# Patient Record
Sex: MALE | Race: WHITE | Employment: FULL TIME | URBAN - METROPOLITAN AREA
[De-identification: names, ages, dates, MRNs, and addresses within clinical notes are randomized per-mention and may not be internally consistent; named-entity substitution may affect disease eponyms.]

---

## 2017-02-27 ENCOUNTER — OFFICE VISIT (OUTPATIENT)
Dept: URGENT CARE | Facility: URGENT CARE | Age: 47
End: 2017-02-27
Payer: COMMERCIAL

## 2017-02-27 ENCOUNTER — RADIANT APPOINTMENT (OUTPATIENT)
Dept: GENERAL RADIOLOGY | Facility: CLINIC | Age: 47
End: 2017-02-27
Attending: INTERNAL MEDICINE
Payer: COMMERCIAL

## 2017-02-27 VITALS
TEMPERATURE: 98.1 F | RESPIRATION RATE: 16 BRPM | BODY MASS INDEX: 26.24 KG/M2 | HEART RATE: 76 BPM | OXYGEN SATURATION: 97 % | DIASTOLIC BLOOD PRESSURE: 82 MMHG | SYSTOLIC BLOOD PRESSURE: 132 MMHG | WEIGHT: 179 LBS

## 2017-02-27 DIAGNOSIS — S99.922A TOE TRAUMA, LEFT, INITIAL ENCOUNTER: ICD-10-CM

## 2017-02-27 DIAGNOSIS — S99.922A TOE TRAUMA, LEFT, INITIAL ENCOUNTER: Primary | ICD-10-CM

## 2017-02-27 PROCEDURE — 73660 X-RAY EXAM OF TOE(S): CPT | Mod: LT

## 2017-02-27 PROCEDURE — 99213 OFFICE O/P EST LOW 20 MIN: CPT | Performed by: INTERNAL MEDICINE

## 2017-02-27 ASSESSMENT — ENCOUNTER SYMPTOMS
SENSORY CHANGE: 0
FOCAL WEAKNESS: 0

## 2017-02-27 NOTE — MR AVS SNAPSHOT
"              After Visit Summary   2/27/2017    Chance Bennett    MRN: [id number]           Patient Information     Date Of Birth          1970        Visit Information        Provider Department      2/27/2017 7:15 PM Flako Payton MD [facility]        Today's Diagnoses     Toe trauma, left, initial encounter    -  1      Care Instructions    Avoid any activity that would strain your 5th toe.    Consider following up with a Podiatrist.    See doctor if your toe pain persist/worsens, or if you develop new symptoms.          Follow-ups after your visit        Who to contact     If you have questions or need follow up information about today's clinic visit or your schedule please contact [facility] directly at [phone].  Normal or non-critical lab and imaging results will be communicated to you by MyChart, letter or phone within 4 business days after the clinic has received the results. If you do not hear from us within 7 days, please contact the clinic through MyChart or phone. If you have a critical or abnormal lab result, we will notify you by phone as soon as possible.  Submit refill requests through [employer] or call your pharmacy and they will forward the refill request to us. Please allow 3 business days for your refill to be completed.          Additional Information About Your Visit        [facility] Information     [employer] lets you send messages to your doctor, view your test results, renew your prescriptions, schedule appointments and more. To sign up, go to [email or web address] . Click on \"Log in\" on the left side of the screen, which will take you to the Welcome page. Then click on \"Sign up Now\" on the right side of the page.     You will be asked to enter the access code listed below, as well as some personal information. Please follow the directions to create your username and password.     Your access code is: [email or web address]  Expires: 5/28/2017  " 8:15 PM     Your access code will  in 90 days. If you need help or a new code, please call your [place] clinic or [phone].        Care EveryWhere ID     This is your Care EveryWhere ID. This could be used by other organizations to access your [place] medical records  [id number]        Your Vitals Were     Pulse Temperature Respirations Pulse Oximetry BMI (Body Mass Index)       76 98.1  F (36.7  C) 16 97% 26.24 kg/m2        Blood Pressure from Last 3 Encounters:   17 132/82   16 122/90   16 138/80    Weight from Last 3 Encounters:   17 179 lb (81.2 kg)   16 174 lb 12.8 oz (79.3 kg)   16 172 lb 11.2 oz (78.3 kg)               Primary Care Provider    None Specified       No primary provider on file.        Thank you!     Thank you for choosing [facility]  for your care. Our goal is always to provide you with excellent care. Hearing back from our patients is one way we can continue to improve our services. Please take a few minutes to complete the written survey that you may receive in the mail after your visit with us. Thank you!             Your Updated Medication List - Protect others around you: Learn how to safely use, store and throw away your medicines at www.disposemymeds.org.          This list is accurate as of: 17  8:15 PM.  Always use your most recent med list.                   Brand Name Dispense Instructions for use    lisinopril-hydrochlorothiazide 20-25 MG per tablet    PRINZIDE/ZESTORETIC     Take 1 tablet by mouth daily

## 2017-02-28 NOTE — PROGRESS NOTES
HPI Comments:   Accidentally stubbed his 5th toe on the left foot this morning.  Developed moderate pain since then.  No swelling.    Musculoskeletal Problem   This is a new problem. The current episode started today. The problem occurs constantly. The problem has been gradually worsening. The symptoms are aggravated by walking. He has tried nothing for the symptoms.       Past medical history: no history of toe fracture      Review of Systems   Neurological: Negative for sensory change and focal weakness.       /82  Pulse 76  Temp 98.1  F (36.7  C)  Resp 16  Wt 179 lb (81.2 kg)  SpO2 97%  BMI 26.24 kg/m2      Physical Exam   Constitutional: No distress.   Musculoskeletal: He exhibits tenderness (5th toe of left foot). He exhibits no edema.   Skin: No erythema.   Vitals reviewed.        ICD-10-CM    1. Toe trauma, left, initial encounter S99.922A XR Toe Left G/E 2 Views       Patient Instructions   Avoid any activity that would strain your 5th toe.    Consider following up with a Podiatrist.    See doctor if your toe pain persist/worsens, or if you develop new symptoms.

## 2017-02-28 NOTE — NURSING NOTE
"Chief Complaint   Patient presents with     Urgent Care     Toe Injury     left 5th toe injury this morning      Initial /82  Pulse 76  Temp 98.1  F (36.7  C)  Resp 16  Wt 179 lb (81.2 kg)  SpO2 97%  BMI 26.24 kg/m2 Estimated body mass index is 26.24 kg/(m^2) as calculated from the following:    Height as of 5/2/16: 5' 9.25\" (1.759 m).    Weight as of this encounter: 179 lb (81.2 kg)..  BP completed using cuff size: regular  S ARTURO, CMA    "

## 2017-02-28 NOTE — PATIENT INSTRUCTIONS
Avoid any activity that would strain your 5th toe.    Consider following up with a Podiatrist.    See doctor if your toe pain persist/worsens, or if you develop new symptoms.

## 2017-12-12 ENCOUNTER — OFFICE VISIT (OUTPATIENT)
Dept: URGENT CARE | Facility: URGENT CARE | Age: 47
End: 2017-12-12
Payer: COMMERCIAL

## 2017-12-12 ENCOUNTER — RADIANT APPOINTMENT (OUTPATIENT)
Dept: GENERAL RADIOLOGY | Facility: CLINIC | Age: 47
End: 2017-12-12
Attending: FAMILY MEDICINE
Payer: COMMERCIAL

## 2017-12-12 VITALS
HEIGHT: 69 IN | WEIGHT: 175 LBS | SYSTOLIC BLOOD PRESSURE: 120 MMHG | DIASTOLIC BLOOD PRESSURE: 80 MMHG | HEART RATE: 85 BPM | TEMPERATURE: 98 F | BODY MASS INDEX: 25.92 KG/M2 | OXYGEN SATURATION: 99 %

## 2017-12-12 DIAGNOSIS — M79.672 LEFT FOOT PAIN: ICD-10-CM

## 2017-12-12 DIAGNOSIS — M25.572 PAIN IN JOINT, ANKLE AND FOOT, LEFT: ICD-10-CM

## 2017-12-12 DIAGNOSIS — S93.402A SPRAIN OF LEFT ANKLE, UNSPECIFIED LIGAMENT, INITIAL ENCOUNTER: Primary | ICD-10-CM

## 2017-12-12 PROCEDURE — 73610 X-RAY EXAM OF ANKLE: CPT | Mod: LT

## 2017-12-12 PROCEDURE — 99213 OFFICE O/P EST LOW 20 MIN: CPT | Performed by: FAMILY MEDICINE

## 2017-12-12 PROCEDURE — 73630 X-RAY EXAM OF FOOT: CPT | Mod: LT

## 2017-12-12 NOTE — MR AVS SNAPSHOT
"              After Visit Summary   12/12/2017    Chance Bennett    MRN: [id number]           Patient Information     Date Of Birth          1970        Visit Information        Provider Department      12/12/2017 6:35 PM Perla Puckett MD [facility]        Today's Diagnoses     Left foot pain    -  1    Pain in joint, ankle and foot, left          Care Instructions    No fractures seen by me on x-rays tonight.    Ibuprofen and ice.    Ankle Aircast splint while awake to provide extra support/protection.    Use crutches until you are able to weight-bear.    Arnica gel:  Apply small amount to ankle/foot up to 3 times daily to help with pain/swelling.    Follow up with sports medicine if not steadily improving over the next 10 days or so, sooner if worsening.          Follow-ups after your visit        Who to contact     If you have questions or need follow up information about today's clinic visit or your schedule please contact [facility] directly at [phone].  Normal or non-critical lab and imaging results will be communicated to you by Well[employer][email or web address], letter or phone within 4 business days after the clinic has received the results. If you do not hear from us within 7 days, please contact the clinic through [employer] or phone. If you have a critical or abnormal lab result, we will notify you by phone as soon as possible.  Submit refill requests through [employer] or call your pharmacy and they will forward the refill request to us. Please allow 3 business days for your refill to be completed.          Additional Information About Your Visit        Well[email or web address] Information     [employer] lets you send messages to your doctor, view your test results, renew your prescriptions, schedule appointments and more. To sign up, go to [email or web address] . Click on \"Log in\" on the left side of the screen, which will take you to the Welcome page. Then click on \"Sign up Now\" on the right side of the " "page.     You will be asked to enter the access code listed below, as well as some personal information. Please follow the directions to create your username and password.     Your access code is: [email or web address]  Expires: 3/12/2018  7:15 PM     Your access code will  in 90 days. If you need help or a new code, please call your [place] clinic or [phone].        Care EveryWhere ID     This is your Care EveryWhere ID. This could be used by other organizations to access your [place] medical records  [id number]        Your Vitals Were     Pulse Temperature Height Pulse Oximetry BMI (Body Mass Index)       85 98  F (36.7  C) (Oral) 5' 9.25\" (1.759 m) 99% 25.66 kg/m2        Blood Pressure from Last 3 Encounters:   17 120/80   17 132/82   16 122/90    Weight from Last 3 Encounters:   17 175 lb (79.4 kg)   17 179 lb (81.2 kg)   16 174 lb 12.8 oz (79.3 kg)               Primary Care Provider Office Phone # Fax #    Maxwell Gusman [phone]       [facility] [address] [facility] [address]        Equal Access to Services     [facility] AH: Hadii aad ku hadasho Soomaali, waaxda luqadaha, qaybta kaalmada adeegyada, waxay idiin hayaan patrick ford . So [facility] [place] [phone].    ATENCIÓN: Si habla español, tiene a wiggins disposición servicios gratuitos de asistencia lingüística. Llame al [phone].    We comply with applicable federal civil rights laws and [place] laws. We do not discriminate on the basis of race, color, national origin, age, disability, sex, sexual orientation, or gender identity.            Thank you!     Thank you for choosing [facility]  for your care. Our goal is always to provide you with excellent care. Hearing back from our patients is one way we can continue to improve our services. Please take a few minutes to complete the written survey that you may receive in the mail after your visit with us. Thank you!   "           Your Updated Medication List - Protect others around you: Learn how to safely use, store and throw away your medicines at www.disposemymeds.org.          This list is accurate as of: 12/12/17  7:15 PM.  Always use your most recent med list.                   Brand Name Dispense Instructions for use Diagnosis    lisinopril-hydrochlorothiazide 20-25 MG per tablet    PRINZIDE/ZESTORETIC     Take 1 tablet by mouth daily        order for DME     1 Device    Equipment being ordered: soft-top post op show    Toe trauma, left, initial encounter

## 2017-12-13 NOTE — PROGRESS NOTES
"SUBJECTIVE  Chance Bennett is a 47 year old male who presents today with left ankle pain that occurred last night.    The mechanism of injury includes: inversion strain while playing basketball. Pain was sudden onset and still present and moderately severe.  Therapies to improve symptoms include: ice and NSAIDs  History of recurrent ankle injuries: yes  Pain is not changed since onset.  Aggravating factors: weight-bearing and movement, Relieved partially byest.    No past medical history on file.  Current Outpatient Prescriptions   Medication Sig Dispense Refill     lisinopril-hydrochlorothiazide (PRINZIDE,ZESTORETIC) 20-25 MG per tablet Take 1 tablet by mouth daily       order for DME Equipment being ordered: soft-top post op show 1 Device 0     Social History   Substance Use Topics     Smoking status: Never Smoker     Smokeless tobacco: Former User     Alcohol use 0.0 oz/week     0 Standard drinks or equivalent per week       ROS:  No numbness or tingling in the foot.    OBJECTIVE:  /80  Pulse 85  Temp 98  F (36.7  C) (Oral)  Ht 5' 9.25\" (1.759 m)  Wt 175 lb (79.4 kg)  SpO2 99%  BMI 25.66 kg/m2  EXAM: Patient appears alert,no apparent distress.  Using crutches to aid ambulation.  Ankle Exam: left    Inspection: swelling around the lateral malleolus as well as across the dorsum of the foot.    Palpation: tender over 5th metatarsal mid-shaft but not at the base.  Tender over posterior edge of the distal fibula.    Both doralis pedis and posterior tibial pulses intact.  There is mild ecchymosis across the dorsum of the foot.  Neuro:Normal strength and tone, sensory exam grossly normal    X-Ray: no fractures seen by me.  Reading confirmed by radiologist.    ASSESSMENT  Inversion ankle strain    PLAN  Patient Instructions   No fractures seen by me on x-rays tonight.    Ibuprofen and ice.    Ankle Aircast splint while awake to provide extra support/protection.    Use crutches until you are able to " weight-bear.    Arnica gel:  Apply small amount to ankle/foot up to 3 times daily to help with pain/swelling.    Follow up with sports medicine if not steadily improving over the next 10 days or so, sooner if worsening.

## 2017-12-13 NOTE — NURSING NOTE
"No chief complaint on file.      Initial /80  Pulse 85  Temp 98  F (36.7  C) (Oral)  Ht 5' 9.25\" (1.759 m)  Wt 175 lb (79.4 kg)  SpO2 99%  BMI 25.66 kg/m2 Estimated body mass index is 25.66 kg/(m^2) as calculated from the following:    Height as of this encounter: 5' 9.25\" (1.759 m).    Weight as of this encounter: 175 lb (79.4 kg).  Medication Reconciliation: complete   Nicole Mason MA// December 12, 2017 6:40 PM          "

## 2017-12-13 NOTE — PATIENT INSTRUCTIONS
No fractures seen by me on x-rays tonight.    Ibuprofen and ice.    Ankle Aircast splint while awake to provide extra support/protection.    Use crutches until you are able to weight-bear.    Arnica gel:  Apply small amount to ankle/foot up to 3 times daily to help with pain/swelling.    Follow up with sports medicine if not steadily improving over the next 10 days or so, sooner if worsening.

## 2018-05-07 ENCOUNTER — OFFICE VISIT (OUTPATIENT)
Dept: URGENT CARE | Facility: URGENT CARE | Age: 48
End: 2018-05-07
Payer: COMMERCIAL

## 2018-05-07 VITALS
BODY MASS INDEX: 26.68 KG/M2 | WEIGHT: 182 LBS | OXYGEN SATURATION: 97 % | SYSTOLIC BLOOD PRESSURE: 153 MMHG | HEART RATE: 75 BPM | TEMPERATURE: 98.7 F | DIASTOLIC BLOOD PRESSURE: 97 MMHG

## 2018-05-07 DIAGNOSIS — K04.7 DENTAL ABSCESS: Primary | ICD-10-CM

## 2018-05-07 PROCEDURE — 99213 OFFICE O/P EST LOW 20 MIN: CPT | Performed by: PHYSICIAN ASSISTANT

## 2018-05-07 RX ORDER — PENICILLIN V POTASSIUM 500 MG/1
500 TABLET, FILM COATED ORAL 4 TIMES DAILY
Qty: 40 TABLET | Refills: 0 | Status: SHIPPED | OUTPATIENT
Start: 2018-05-07 | End: 2019-09-21

## 2018-05-07 RX ORDER — ACETAMINOPHEN 160 MG
2000 TABLET,DISINTEGRATING ORAL
COMMUNITY
Start: 2012-11-06

## 2018-05-07 NOTE — PATIENT INSTRUCTIONS
"  Dental Abscess   A dental abscess is an infection of the tooth socket. It often starts with a crack or cavity in the tooth. A pocket of pus forms between the tooth and the bone. The infection causes pain and swelling of the gum, cheek, or jaw. The pain is often made worse by drinking hot or cold fluids, or biting on hard foods. Pain may be felt in the facial sinus or in the ear. A severe infection can interfere with swallowing and breathing.  Causes    Cavities    Trauma    Previous dental work  Symptoms    Pain    Swelling around the tooth or face and cheek    Redness    Bad breath    Bad taste in the mouth    Fever  You will be started on an antibiotic. However, final treatment requires drainage of the pus. This can be done by removing the tooth or performing a root canal. A root canal is done by an oral surgeon. It involves drilling an opening in the tooth to drain the pus. After the infection has healed, a crown is placed over the tooth.  Home care  The following guidelines will help you care for your abscess at home:    Don't have hot or cold foods and liquids. Your tooth may be sensitive to temperature changes.    If your tooth is chipped or cracked, or if there is a large open cavity, apply oil of cloves (available over-the-counter in drugstores) directly to the tooth to reduce pain. Some drugstores carry an over-the-counter \"toothache kit.\" This contains oil of cloves and a paste, which can be applied over the exposed tooth to decrease sensitivity.    Apply an ice pack (ice cubes in a plastic bag, wrapped in a towel) over the injured area for 10 to 20 minutes every 1 to 2 hours the first day for pain relief. Continue this 3 to 4 times a day until the pain and swelling goes away.    You can take acetaminophen or ibuprofen for pain, unless you were given a different pain medicine to use. If you have chronic liver or kidney disease, have ever had a stomach ulcer or gastrointestinal bleeding, or are taking " blood-thinning medicines, talk with your healthcare provider before using these medicines.    An antibiotic will be prescribed. Take it as directed until completed, even if you are feeling better sooner.  Follow-up care  Follow up as advised with a dentist or oral surgeon. Even though your pain may improve with the treatment given today, only a dentist or oral surgeon can provide full treatment for this problem.    If a culture was done, you will be notified if the treatment needs to be changed. You can call in as directed for the results.    If X-rays were taken, they will be reviewed by a specialist. You will be notified of the results, especially if they affect treatment.  Call 911  Call 911 if any of these occur:    Trouble breathing or swallowing, or wheezing    Hoarse voice or trouble speaking    Confusion    Extreme drowsiness or trouble awakening    Fainting or loss of consciousness    Rapid heart rate  When to seek medical advice  Call your healthcare provider right away if any of these occur:    Swollen or red face or eyelid    Pain gets worse or spreads to the neck    Fever of 100.4 F (38 C) or higher, or as directed by your healthcare provider    Unusual drowsiness, a headache or stiff neck, or weakness    Pus drains from the gum or tooth    You can't open your mouth wide  Date Last Reviewed: 7/30/2015 2000-2017 [employer]. [address] [place] [address], [place] [address]. All rights reserved. This information is not intended as a substitute for professional medical care. Always follow your healthcare professional's instructions.

## 2018-05-07 NOTE — PROGRESS NOTES
CHIEF COMPLAINT:   Chief Complaint   Patient presents with     Urgent Care     Jaw Pain     Pt states having left jaw and cheek swelling x 3 days ago.        HPI: Chance Bennett is a 48 year old male who presents to clinic today for evaluation of left sided jaw and cheek swelling. He noted that the symptoms started 3 days ago with teeth pain on the left upper side of face. Over the past day the pain has moved from his teeth to the area of his maxillary sinus. In February, he had dental work completed. Has not had problems with the area until now. Today he noted a swelling along the left side of his jaw, area in non tender. He denies fever, chills, n/v/d. No drainage noted from gumline.     No past medical history on file.  No past surgical history on file.  Social History   Substance Use Topics     Smoking status: Never Smoker     Smokeless tobacco: Former User     Alcohol use 0.0 oz/week     0 Standard drinks or equivalent per week     Current Outpatient Prescriptions   Medication     Cholecalciferol (VITAMIN D3) 2000 units CAPS     lisinopril-hydrochlorothiazide (PRINZIDE,ZESTORETIC) 20-25 MG per tablet     penicillin V potassium (VEETID) 500 MG tablet     No current facility-administered medications for this visit.      Allergies   Allergen Reactions     Dust Mite Extract Itching     Watery eyes       10 point ROS of systems including Constitutional, Eyes, Respiratory, Cardiovascular, Gastroenterology, Genitourinary, Integumentary, Muscularskeletal, Psychiatric were all negative except for pertinent positives noted in my HPI.        Exam:  BP (!) 153/97 (BP Location: Right arm, Patient Position: Chair, Cuff Size: Adult Large)  Pulse 75  Temp 98.7  F (37.1  C) (Tympanic)  Wt 182 lb (82.6 kg)  SpO2 97%  BMI 26.68 kg/m2  Constitutional: healthy, alert and no distress  Head: Normocephalic, atraumatic.  Eyes: conjunctiva clear, no drainage  ENT: TMs clear and shiny raine, nasal mucosa pink and moist, throat without  tonsillar hypertrophy or erythema Mouth: NO dental tenderness or abscess noted  Neck: neck is supple, no cervical lymphadenopathy. Swelling noted over left side of jaw, NO TTP.   Cardiovascular: RRR  Respiratory: CTA bilaterally, no rhonchi or rales  Skin: Tenderness to palpation over left side of cheek, mild erythema. No fluctuance.   Neurologic: Speech clear, gait normal. Moves all extremities.       ASSESSMENT/PLAN:  1. Dental abscess  Will start on PCN VK today to treat suspected dental infection.   IBU 600mg and warm compresses for pain  Follow up this week with dentist for evaluation and imaging.     - penicillin V potassium (VEETID) 500 MG tablet; Take 1 tablet (500 mg) by mouth 4 times daily  Dispense: 40 tablet; Refill: 0    Perla Wakefield PA-C

## 2018-05-07 NOTE — MR AVS SNAPSHOT
"              After Visit Summary   5/7/2018    Chance Bennett    MRN: [id number]           Patient Information     Date Of Birth          1970        Visit Information        Provider Department      5/7/2018 4:20 PM Perla Wakefield PA-C [facility]        Today's Diagnoses     Pain, dental    -  1      Care Instructions      Dental Abscess   A dental abscess is an infection of the tooth socket. It often starts with a crack or cavity in the tooth. A pocket of pus forms between the tooth and the bone. The infection causes pain and swelling of the gum, cheek, or jaw. The pain is often made worse by drinking hot or cold fluids, or biting on hard foods. Pain may be felt in the facial sinus or in the ear. A severe infection can interfere with swallowing and breathing.  Causes    Cavities    Trauma    Previous dental work  Symptoms    Pain    Swelling around the tooth or face and cheek    Redness    Bad breath    Bad taste in the mouth    Fever  You will be started on an antibiotic. However, final treatment requires drainage of the pus. This can be done by removing the tooth or performing a root canal. A root canal is done by an oral surgeon. It involves drilling an opening in the tooth to drain the pus. After the infection has healed, a crown is placed over the tooth.  Home care  The following guidelines will help you care for your abscess at home:    Don't have hot or cold foods and liquids. Your tooth may be sensitive to temperature changes.    If your tooth is chipped or cracked, or if there is a large open cavity, apply oil of cloves (available over-the-counter in drugstores) directly to the tooth to reduce pain. Some drugstores carry an over-the-counter \"toothache kit.\" This contains oil of cloves and a paste, which can be applied over the exposed tooth to decrease sensitivity.    Apply an ice pack (ice cubes in a plastic bag, wrapped in a towel) over the injured area for 10 to 20 minutes " every 1 to 2 hours the first day for pain relief. Continue this 3 to 4 times a day until the pain and swelling goes away.    You can take acetaminophen or ibuprofen for pain, unless you were given a different pain medicine to use. If you have chronic liver or kidney disease, have ever had a stomach ulcer or gastrointestinal bleeding, or are taking blood-thinning medicines, talk with your healthcare provider before using these medicines.    An antibiotic will be prescribed. Take it as directed until completed, even if you are feeling better sooner.  Follow-up care  Follow up as advised with a dentist or oral surgeon. Even though your pain may improve with the treatment given today, only a dentist or oral surgeon can provide full treatment for this problem.    If a culture was done, you will be notified if the treatment needs to be changed. You can call in as directed for the results.    If X-rays were taken, they will be reviewed by a specialist. You will be notified of the results, especially if they affect treatment.  Call 911  Call 911 if any of these occur:    Trouble breathing or swallowing, or wheezing    Hoarse voice or trouble speaking    Confusion    Extreme drowsiness or trouble awakening    Fainting or loss of consciousness    Rapid heart rate  When to seek medical advice  Call your healthcare provider right away if any of these occur:    Swollen or red face or eyelid    Pain gets worse or spreads to the neck    Fever of 100.4 F (38 C) or higher, or as directed by your healthcare provider    Unusual drowsiness, a headache or stiff neck, or weakness    Pus drains from the gum or tooth    You can't open your mouth wide  Date Last Reviewed: 7/30/2015 2000-2017 [email or web address]. [address] [place] [address], [place] [address]. All rights reserved. This information is not intended as a substitute for professional medical care. Always follow your healthcare professional's instructions.                 "Follow-ups after your visit        Who to contact     If you have questions or need follow up information about today's clinic visit or your schedule please contact [facility] directly at [phone].  Normal or non-critical lab and imaging results will be communicated to you by MyChart, letter or phone within 4 business days after the clinic has received the results. If you do not hear from us within 7 days, please contact the clinic through MyChart or phone. If you have a critical or abnormal lab result, we will notify you by phone as soon as possible.  Submit refill requests through [email or web address] or call your pharmacy and they will forward the refill request to us. Please allow 3 business days for your refill to be completed.          Additional Information About Your Visit        [email or web address] Information     [email or web address] lets you send messages to your doctor, view your test results, renew your prescriptions, schedule appointments and more. To sign up, go to [email or web address] . Click on \"Log in\" on the left side of the screen, which will take you to the Welcome page. Then click on \"Sign up Now\" on the right side of the page.     You will be asked to enter the access code listed below, as well as some personal information. Please follow the directions to create your username and password.     Your access code is: [email or web address]  Expires: 2018  4:37 PM     Your access code will  in 90 days. If you need help or a new code, please call your [facility] clinic or [phone].        Care EveryWhere ID     This is your Care EveryWhere ID. This could be used by other organizations to access your [facility] medical records  [id number]        Your Vitals Were     Pulse Temperature Pulse Oximetry BMI (Body Mass Index)          75 98.7  F (37.1  C) (Tympanic) 97% 26.68 kg/m2         Blood Pressure from Last 3 Encounters:   18 (!) 153/97   17 120/80   17 132/82    Weight from Last 3 Encounters: "   05/07/18 182 lb (82.6 kg)   12/12/17 175 lb (79.4 kg)   02/27/17 179 lb (81.2 kg)              Today, you had the following     No orders found for display         Today's Medication Changes          These changes are accurate as of 5/7/18  4:37 PM.  If you have any questions, ask your nurse or doctor.               Start taking these medicines.        Dose/Directions    penicillin V potassium 500 MG tablet   Commonly known as:  VEETID   Used for:  Pain, dental   Started by:  Perla Wakefield PA-C        Dose:  500 mg   Take 1 tablet (500 mg) by mouth 4 times daily   Quantity:  40 tablet   Refills:  0            Where to get your medicines      These medications were sent to [id number] - [place], [place] - [address]  AT [facility]  [address] [place] [address]     Phone:  [phone]     penicillin V potassium 500 MG tablet                Primary Care Provider Office Phone # Fax #    Maxwell Gusman [phone]       [facility] [place] [address]   [place] [address]        Equal Access to Services     [facility]: Hadii aad ku hadasho Soomaali, waaxda luqadaha, qaybta kaalmada adeegyada, gregory saldaña haysaurav ford . So [facility] [phone].    ATENCIÓN: Si habla español, tiene a wiggins disposición servicios gratuitos de asistencia lingüística. [facility] [phone].    We comply with applicable federal civil rights laws and [place] laws. We do not discriminate on the basis of race, color, national origin, age, disability, sex, sexual orientation, or gender identity.            Thank you!     Thank you for choosing [facility]  for your care. Our goal is always to provide you with excellent care. Hearing back from our patients is one way we can continue to improve our services. Please take a few minutes to complete the written survey that you may receive in the mail after your visit with us. Thank you!              Your Updated Medication List - Protect others around you: Learn how to safely use, store and throw away your medicines at www.disposemymeds.org.          This list is accurate as of 5/7/18  4:37 PM.  Always use your most recent med list.                   Brand Name Dispense Instructions for use Diagnosis    lisinopril-hydrochlorothiazide 20-25 MG per tablet    PRINZIDE/ZESTORETIC     Take 1 tablet by mouth daily        * order for DME     1 Device    Equipment being ordered: soft-top post op show    Toe trauma, left, initial encounter       * order for DME     1 each    Ankle AirCast    Sprain of left ankle, unspecified ligament, initial encounter       penicillin V potassium 500 MG tablet    VEETID    40 tablet    Take 1 tablet (500 mg) by mouth 4 times daily    Pain, dental       vitamin D3 2000 units Caps      Take 2,000 Units by mouth        * Notice:  This list has 2 medication(s) that are the same as other medications prescribed for you. Read the directions carefully, and ask your doctor or other care provider to review them with you.

## 2019-09-21 ENCOUNTER — OFFICE VISIT (OUTPATIENT)
Dept: URGENT CARE | Facility: URGENT CARE | Age: 49
End: 2019-09-21
Payer: COMMERCIAL

## 2019-09-21 VITALS
HEART RATE: 78 BPM | BODY MASS INDEX: 28 KG/M2 | WEIGHT: 191 LBS | SYSTOLIC BLOOD PRESSURE: 130 MMHG | DIASTOLIC BLOOD PRESSURE: 88 MMHG | RESPIRATION RATE: 12 BRPM | OXYGEN SATURATION: 98 % | TEMPERATURE: 98.8 F

## 2019-09-21 DIAGNOSIS — H65.91 OME (OTITIS MEDIA WITH EFFUSION), RIGHT: Primary | ICD-10-CM

## 2019-09-21 PROCEDURE — 99213 OFFICE O/P EST LOW 20 MIN: CPT | Performed by: INTERNAL MEDICINE

## 2019-09-21 NOTE — PROGRESS NOTES
[facility] urgent Care Progress Note        Cece Palencia MD, MPH  09/21/2019        History:      Chance Bennett is a pleasant 49 year old year old male with a chief complaint of right ear pain w/o drainage since 3 days ago.   No fever or chills.   No dyspnea or wheezing.   No smoking history.   No headache or neck pain/stiffness.  No GI or  symptoms.   No MSK symptoms.         Assessment and Plan:         OME (otitis media with effusion), right    - amoxicillin-clavulanate (AUGMENTIN) 875-125 MG tablet; Take 1 tablet by mouth 2 times daily for 10 days  Dispense: 20 tablet; Refill: 0    Discussed supportive care with the patient  Advised to increase fluid intake and rest.  Tylenol 650 mg po for pain q 6 hours as needed.  F/u w PCP in 4-5 days, earlier if symptoms worsen.                   Physical Exam:      /88 (BP Location: Right arm, Patient Position: Sitting, Cuff Size: Adult Regular)   Pulse 78   Temp 98.8  F (37.1  C) (Oral)   Resp 12   Wt 86.6 kg (191 lb)   SpO2 98%   BMI 28.00 kg/m       Constitutional: Patient is in no distress The patient is pleasant and cooperative.   HEENT: Head:  Head is atraumatic, normocephalic.    Eyes: Pupils are equal, round and reactive to light and accomodation.  Sclera is non-icteric. No conjunctival injection, or exudate noted. Extraocular motion is intact. Visual acuity is intact bilaterally.  Ears:  External acoustic canals are patent and clear. There is erythema , injection and effusion of the ( R ) tympanic membrane. Left TM is normal on exam.  Nose: No Nasal congestion or drainage or mucosal ulceration is noted.  Throat:  Oral mucosa is moist.  No oral lesions are noted. Posterior pharyngeal hyperemia w/o exudate noted.     Neck Supple.  There is no cervical lymphadenopathy.  No nuchal rigidity noted.  There is no meningismus.     Cardiovascular: Heart is regular to rate and rhythm.  No murmur is noted.     Lungs: Clear in the anterior and posterior  pulmonary fields.                                      Data:      All new lab and imaging data was reviewed.   Results for orders placed or performed in visit on 12/12/17   XR Foot Left G/E 3 Views    Narrative    LEFT FOOT THREE OR MORE VIEWS   12/12/2017 7:03 PM     HISTORY: Inversion injury left ankle, now with significant swelling in  ankle and foot and tender over midshaft 5th metatarsal. Rule out  fracture. Left foot pain.    COMPARISON: None.      Impression    IMPRESSION: Normal.    ASHLEY FITZPATRICK MD

## 2019-09-21 NOTE — NURSING NOTE
"Chief Complaint   Patient presents with     Urgent Care     Ear Problem     Right ear pain for a couple days.  He has not had any draiange from that ear.  No fever that he knows of.  He has had ongoing tooth pain and knows he needs a root canal and wonders if it is related.     Initial /88 (BP Location: Right arm, Patient Position: Sitting, Cuff Size: Adult Regular)   Pulse 78   Temp 98.8  F (37.1  C) (Oral)   Resp 12   Wt 86.6 kg (191 lb)   SpO2 98%   BMI 28.00 kg/m   Estimated body mass index is 28 kg/m  as calculated from the following:    Height as of 12/12/17: 1.759 m (5' 9.25\").    Weight as of this encounter: 86.6 kg (191 lb)..  BP completed using cuff size:  regular  Daya Dubon R.N.    "

## 2021-09-21 ENCOUNTER — OFFICE VISIT (OUTPATIENT)
Dept: URGENT CARE | Facility: URGENT CARE | Age: 51
End: 2021-09-21
Payer: COMMERCIAL

## 2021-09-21 VITALS
SYSTOLIC BLOOD PRESSURE: 142 MMHG | DIASTOLIC BLOOD PRESSURE: 84 MMHG | HEART RATE: 90 BPM | OXYGEN SATURATION: 98 % | TEMPERATURE: 98.3 F

## 2021-09-21 DIAGNOSIS — Z20.822 PERSON UNDER INVESTIGATION FOR COVID-19: Primary | ICD-10-CM

## 2021-09-21 DIAGNOSIS — K08.89 PAIN IN TOOTH: ICD-10-CM

## 2021-09-21 LAB
DEPRECATED S PYO AG THROAT QL EIA: NEGATIVE
GROUP A STREP BY PCR: NOT DETECTED

## 2021-09-21 PROCEDURE — 99213 OFFICE O/P EST LOW 20 MIN: CPT | Performed by: PHYSICIAN ASSISTANT

## 2021-09-21 PROCEDURE — 87651 STREP A DNA AMP PROBE: CPT | Performed by: PHYSICIAN ASSISTANT

## 2021-09-21 PROCEDURE — U0005 INFEC AGEN DETEC AMPLI PROBE: HCPCS | Performed by: PHYSICIAN ASSISTANT

## 2021-09-21 PROCEDURE — U0003 INFECTIOUS AGENT DETECTION BY NUCLEIC ACID (DNA OR RNA); SEVERE ACUTE RESPIRATORY SYNDROME CORONAVIRUS 2 (SARS-COV-2) (CORONAVIRUS DISEASE [COVID-19]), AMPLIFIED PROBE TECHNIQUE, MAKING USE OF HIGH THROUGHPUT TECHNOLOGIES AS DESCRIBED BY CMS-2020-01-R: HCPCS | Performed by: PHYSICIAN ASSISTANT

## 2021-09-21 NOTE — PROGRESS NOTES
SUBJECTIVE:     Chance Bennett is a 51 year old male presenting with a chief complaint of tooth pain for 1 week, now with sore throat, sore ears for a few days .  Some runny nose. Sinusitis on and off through the summer.    Will follow up with dentist.        Immunization History   Administered Date(s) Administered     COVID-19,PF,Pfizer 03/31/2021, 04/27/2021     Tdap (Adacel,Boostrix) 10/02/2012         No past medical history on file.  Current Outpatient Medications   Medication Sig Dispense Refill     Cholecalciferol (VITAMIN D3) 2000 units CAPS Take 2,000 Units by mouth       lisinopril-hydrochlorothiazide (PRINZIDE,ZESTORETIC) 20-25 MG per tablet Take 1 tablet by mouth daily       Social History     Tobacco Use     Smoking status: Never Smoker     Smokeless tobacco: Former User   Substance Use Topics     Alcohol use: Yes     Alcohol/week: 0.0 standard drinks       ROS:  10 point ROS negative except as listed above      OBJECTIVE:  BP (!) 142/84   Pulse 90   Temp 98.3  F (36.8  C)   SpO2 98%   GENERAL APPEARANCE: healthy, alert and no distress  EYES: EOMI,  PERRL, conjunctiva clear  HENT: ear canals and TM's normal.  Nose and mouth without ulcers, erythema or lesions. Pain in Tooth 10  NECK: supple, nontender, no lymphadenopathy  RESP: lungs clear to auscultation - no rales, rhonchi or wheezes  CV: regular rates and rhythm, normal S1 S2, no murmur noted  NEURO: Normal strength and tone, sensory exam grossly normal,  normal speech and mentation  SKIN: no suspicious lesions or rashes      Results for orders placed or performed in visit on 09/21/21   Symptomatic COVID-19 Virus (Coronavirus) by PCR Nose     Status: Normal    Specimen: Nose; Swab   Result Value Ref Range    SARS CoV2 PCR Negative Negative    Narrative    Testing was performed using the Aptima SARS-CoV-2 Assay on the  [employer] Instrument System. Additional information about this  Emergency Use Authorization (EUA) assay can be found via the  Lab  Guide. This test should be ordered for the detection of SARS-CoV-2 in  individuals who meet SARS-CoV-2 clinical and/or epidemiological  criteria. Test performance is unknown in asymptomatic patients. This  test is for in vitro diagnostic use under the FDA EUA for  laboratories certified under CLIA to perform high complexity testing.  This test has not been FDA cleared or approved. A negative result  does not rule out the presence of PCR inhibitors in the specimen or  target RNA in concentration below the limit of detection for the  assay. The possibility of a false negative should be considered if  the patient's recent exposure or clinical presentation suggests  COVID-19. This test was validated by the [facility]. This laboratory is certified under  the Clinical Laboratory Improvement Amendments of 1988 (CLIA-88) as  qualified to perform high complexity laboratory testing.   Streptococcus A Rapid Screen w/Reflex to PCR     Status: Normal    Specimen: Throat; Swab   Result Value Ref Range    Group A Strep antigen Negative Negative   Group A Streptococcus PCR Throat Swab     Status: Normal    Specimen: Throat; Swab   Result Value Ref Range    Group A strep by PCR Not Detected Not Detected    Narrative    The Xpert Xpress Strep A test, performed on the [employer], is a rapid, qualitative in vitro diagnostic test for the detection of Streptococcus pyogenes (Group A ß-hemolytic Streptococcus, Strep A) in throat swab specimens from patients with signs and symptoms of pharyngitis. The Xpert Xpress Strep A test can be used as an aid in the diagnosis of Group A Streptococcal pharyngitis. The assay is not intended to monitor treatment for Group A Streptococcus infections. The Xpert Xpress Strep A test utilizes an automated real-time polymerase chain reaction (PCR) to detect Streptococcus pyogenes DNA.       ASSESSMENT:  (Z20.752) Person under  investigation for COVID-19  (primary encounter diagnosis)  (K08.89) Pain in tooth  Plan: Streptococcus A Rapid Screen w/Reflex to PCR,         Group A Streptococcus PCR Throat Swab,         Symptomatic COVID-19 Virus (Coronavirus) by         PCR, amoxicillin-clavulanate (AUGMENTIN)         875-125 MG tablet      Covid-19  Pt was evaluated during a global COVID-19 pandemic, which necessitated consideration that the patient might be at risk for infection with the SARS-CoV-2 virus that causes COVID-19.   Applicable protocols for evaluation were followed during the patient's care.   COVID-19 was considered as part of the patient's evaluation. The plan for testing is:  a test was ordered during this visit.    No severe headache, chest pain, shortness of breath  No additional infectious symptoms  Rest, isolate for 10 days, hydrate, test, follow up if worsening or new symptoms  HH members to isolate until test results, if positive isolate for 2 weeks and follow up for testing if symptoms occur  Red flags and emergent follow up discussed, and understood by patient  Follow up with PCP if symptoms worsen or fail to improve    Surgical mask, gown, shield, hairnet, gloves worn by provider      Patient Instructions     Patient Education     Dental Pain     A crack or cavity in a tooth can cause tooth pain. This is because the crack or cavity exposes the sensitive inner area of the tooth. An infection in the gum or the tooth's root can cause pain and swelling. The pain is often made worse when you have a hot or cold drink. It can also be worse when you bite on hard foods. Pain may spread from the tooth to your ear, or to the part of the jaw on the same side.  Home care  Follow these tips when caring for yourself at home:    Don't have hot and cold foods and drinks. Your tooth may be sensitive to changes in temperature.    Use toothpaste made for sensitive teeth. Brush gently up and down instead of sideways. Brushing sideways can  wear away root surfaces if they are exposed.    If your tooth is chipped or cracked, see a dentist right away. For short-term pain relief, put clove oil right on the tooth. You can buy clove oil at pharmacies. Some pharmacies carry an over-the-counter toothache kit. This has a paste you can put on the exposed tooth to make it less sensitive.    Use a cold pack. Put a cold pack on your jaw over the sore area to help reduce pain.    Ask your healthcare provider about using over-the-counter medicine for pain. You may use this unless your provider prescribed another medicine. If you have long-term (chronic) liver or kidney disease, talk with your provider before using acetaminophen or ibuprofen. Also talk with your provider if you ve had a stomach ulcer or GI (gastrointestinal) bleeding.    Be aware of infection. If you have signs of an infection, you will be given an antibiotic. Take it as directed.  Follow-up care  Follow up with your dentist, or as advised. Your pain may go away with the treatment given today. But only a dentist can fully check and treat the cause of your pain. This will keep the pain from coming back.  Call 911  Call 911 if any of these occur:    Abnormal drowsiness    Headache or stiff neck    Weakness or fainting    Trouble swallowing or breathing  When to get medical advice  Call your healthcare provider right away if any of these occur:    Your face gets swollen or red    Pain gets worse or spreads to your neck    Fever of 100.4 F (38.0 C) or higher, or as directed by your provider    Pus drains from the tooth  [employer] last reviewed this educational content on 12/1/2019 2000-2021 [employer]. All rights reserved. This information is not intended as a substitute for professional medical care. Always follow your healthcare professional's instructions.           Follow up immediately with severe headache, chest pain, or shortness of breath    Rest, isolate for 10 days, hydrate,  follow up if worsening or new symptoms  Household members to isolate until test results, if positive isolate for 2 weeks and follow up for testing if symptoms occur         Patient Education     Coronavirus Disease 2019 (COVID-19): Caring for Yourself or Others   If you or a household member have symptoms of COVID-19, follow the guidelines below. This will help you manage symptoms and keep the virus from spreading.  If you have symptoms of COVID-19    Stay home and contact your care team. They will tell you what to do.    Don t panic. Keep in mind that other illnesses can cause similar symptoms.    Stay away from work, school, and public places.    Limit physical contact with others in your home. Limit visitors. No kissing.  Clean surfaces you touch with disinfectant.  If you need to cough or sneeze, do it into a tissue. Then throw the tissue into the trash. If you don't have tissues, cough or sneeze into the bend of your elbow.  Don t share food or personal items with people in your household. This includes items like eating and drinking utensils, towels, and bedding.  Wear a cloth face mask around other people. During a public health emergency, medical face masks may be reserved for healthcare workers. You may need to make a cloth face mask of your own. You can do this using a bandana, T-shirt, or other cloth. The CDC has instructions on how to make a face mask. Wear the mask so that it covers both your nose and mouth.  If you need to go to a hospital or clinic, call ahead to let them know. Expect the care team to wear masks, gowns, gloves, and eye protection. You may need to come to a different entrance or wait in a separate room.  Follow all instructions from your care team.    If you ve been diagnosed with COVID-19    Stay home and away from others, including other people in your home. (This is called self-isolation.) Don t leave home unless you need to get medical care. Don t go to work, school, or public  places. Don t use buses, taxis, or other public transportation.    Follow all instructions from your care team.    If you need to go to a hospital or clinic, call ahead to let them know. Expect the care team to wear masks, gowns, gloves, and eye protection. You may need to come to a different entrance or wait in a separate room.    Wear a face mask over your nose and mouth. This is to protect others from your germs. If you can t wear a mask, your caregivers should wear one. You may need to make your own mask using a bandana, T-shirt, or other cloth. See the CDC s instructions on how to make a face mask.    Have no contact with pets and other animals.    Don t share food or personal items with people in your household. This includes items like eating and drinking utensils, towels, and bedding.    If you need to cough or sneeze, do it into a tissue. Then throw the tissue into the trash. If you don't have tissues, cough or sneeze into the bend of your elbow.    Wash your hands often.    Self-care at home   At this time, there is no medicine approved to prevent or treat COVID-19. The FDA has approved an antiviral medicine (called remdesivir) for people being treated in the hospital. This is for people 12 years and older who weigh more than about 88 pounds (40 kgs). In certain cases, it may also be used for people younger than 12 years or who weigh less than about 88 pounds (40 kgs)..  Currently, treatment is mostly aimed at helping your body while it fights the virus.    Getting extra rest.    Drink extra fluids 6 to 8 glasses of liquids each day), unless a doctor has told you not to. Ask your care team which fluids are best for you. Avoid fluids that contain caffeine or alcohol.    Taking over-the-counter (OTC) medicine to reduce pain and fever. Your care team will tell you which medicine to use.  If you ve been in the hospital for COVID-19, follow your care team s instructions. They will tell you when to stop  self-isolation. They may also have you change positions to help your breathing (such as lying on your belly).  If a test showed that you have COVID-19, you may be asked to donate plasma after you ve recovered. (This is called COVID-19 convalescent plasma donation.) The plasma may contain antibodies to help fight the virus in others. Experts don't know if the plasma will work well as a treatment. Research continues, and the FDA has approved it for emergency use in certain people with serious or life-threatening COVID-19. For more information, talk to your care team.  Caring for a sick person     Follow all instructions from the care team.    Wash your hands often.    Wear protective clothing as advised.    Make sure the sick person wears a mask. If they can't wear a mask, don t stay in the same room with them. If you must be in the same room, wear a face mask. Make sure the mask covers both the nose and mouth.    Keep track of the sick person s symptoms.    Clean surfaces often with disinfectant. This includes phones, kitchen counters, fridge door handles, bathroom surfaces, and others.    Don t let anyone share household items with the sick person. This includes eating and drinking tools, towels, sheets, or blankets.    Clean fabrics and laundry well.    Keep other people and pets away from the sick person.    When you can stop self-isolation  When you are sick with COVID-19, you should stay away from other people. This is called self-isolation. The rules for ending self-isolation depend on your health in general.  If you are normally healthy:  You can stop self-isolation when all 3 of these are true:    You ve had no fever--and no medicine that reduces fever--for at least 24 hours. And     Your symptoms are better, such as cough or trouble breathing. And     At least 10 days have passed since your symptoms first started.  Talk with your care team before you leave home. They may tell you it s okay to leave, or they  may give you different advice. You do not need to be re-tested.  If you have a weak immune system, or you ve had severe COVID-19:  Follow your care team s instructions. You may be asked to self-isolate for 10 days to 20 days after your symptoms first started. Your care team may want to re-test you for COVID-19.  Note: If you re being treated for cancer, have an immune disorder (such as HIV), or have had a transplant (organ or bone marrow), you may have a weak immune system.  If you've already had COVID-19 once:  If you had the virus over 3 months ago, and you ve been exposed again, treat it like you've never had COVID-19. Stay home, limit your contact with others, call your care team, and watch for symptoms.  If it s been less than 3 months since you had the virus, you re symptom-free, and you ve been exposed again: You don t need to self-isolate or be re-tested. But if you develop new symptoms that can t be linked to another illness, please self-isolate and contact your care team.  When you return to public settings  When you are well enough to go outside your home, follow the CDC s guidance on cloth face masks.    Anyone over age 2 should wear a face mask in public, especially when it's hard to socially distance. This includes public transit, public protests and marches, and crowded stores, bars, and restaurants.    Face masks are most likely to reduce the spread of COVID-19 when they are widely used by people who are out in the public.  Certain people should not wear a face covering. These include:    Children under 2 years old    Anyone with a health, developmental, or mental health condition that can be made worse by wearing a mask    Anyone who is unconscious or unable to remove the face covering without help. See the CDC's guidance on who should not wear a face mask.  When to call your care team  Call your care team right away if a sick person has any of these:    Trouble breathing    Pain or pressure in  chest  If a sick person has any of these, call 911:    Trouble breathing that gets worse    Pain or pressure in chest that gets worse    Blue tint to lips or face    Fast or irregular heartbeat    Confusion or trouble waking    Fainting or loss of consciousness    Coughing up blood  Going home from the hospital   If you have COVID-19 and were recently in the hospital:    Follow the instructions above for self-care and isolation.    Follow the hospital care team s instructions.    Ask questions if anything is unclear to you. Write down answers so you remember them.  Date last modified: 11/23/2020  [employer] last reviewed this educational content on 4/1/2020  This information has been modified by your health care provider with permission from the publisher.    5859-0389 [employer] [facility]. [address] [place] [address], [place] [address]. All rights reserved. This information is not intended as a substitute for professional medical care. Always follow your healthcare professional's instructions.

## 2021-09-21 NOTE — PATIENT INSTRUCTIONS
Patient Education     Dental Pain     A crack or cavity in a tooth can cause tooth pain. This is because the crack or cavity exposes the sensitive inner area of the tooth. An infection in the gum or the tooth's root can cause pain and swelling. The pain is often made worse when you have a hot or cold drink. It can also be worse when you bite on hard foods. Pain may spread from the tooth to your ear, or to the part of the jaw on the same side.  Home care  Follow these tips when caring for yourself at home:    Don't have hot and cold foods and drinks. Your tooth may be sensitive to changes in temperature.    Use toothpaste made for sensitive teeth. Brush gently up and down instead of sideways. Brushing sideways can wear away root surfaces if they are exposed.    If your tooth is chipped or cracked, see a dentist right away. For short-term pain relief, put clove oil right on the tooth. You can buy clove oil at pharmacies. Some pharmacies carry an over-the-counter toothache kit. This has a paste you can put on the exposed tooth to make it less sensitive.    Use a cold pack. Put a cold pack on your jaw over the sore area to help reduce pain.    Ask your healthcare provider about using over-the-counter medicine for pain. You may use this unless your provider prescribed another medicine. If you have long-term (chronic) liver or kidney disease, talk with your provider before using acetaminophen or ibuprofen. Also talk with your provider if you ve had a stomach ulcer or GI (gastrointestinal) bleeding.    Be aware of infection. If you have signs of an infection, you will be given an antibiotic. Take it as directed.  Follow-up care  Follow up with your dentist, or as advised. Your pain may go away with the treatment given today. But only a dentist can fully check and treat the cause of your pain. This will keep the pain from coming back.  Call 911  Call 911 if any of these occur:    Abnormal drowsiness    Headache or stiff  neck    Weakness or fainting    Trouble swallowing or breathing  When to get medical advice  Call your healthcare provider right away if any of these occur:    Your face gets swollen or red    Pain gets worse or spreads to your neck    Fever of 100.4 F (38.0 C) or higher, or as directed by your provider    Pus drains from the tooth  [employer] last reviewed this educational content on 12/1/2019 2000-2021 [employer]. All rights reserved. This information is not intended as a substitute for professional medical care. Always follow your healthcare professional's instructions.           Follow up immediately with severe headache, chest pain, or shortness of breath    Rest, isolate for 10 days, hydrate, follow up if worsening or new symptoms  Household members to isolate until test results, if positive isolate for 2 weeks and follow up for testing if symptoms occur         Patient Education     Coronavirus Disease 2019 (COVID-19): Caring for Yourself or Others   If you or a household member have symptoms of COVID-19, follow the guidelines below. This will help you manage symptoms and keep the virus from spreading.  If you have symptoms of COVID-19    Stay home and contact your care team. They will tell you what to do.    Don t panic. Keep in mind that other illnesses can cause similar symptoms.    Stay away from work, school, and public places.    Limit physical contact with others in your home. Limit visitors. No kissing.  Clean surfaces you touch with disinfectant.  If you need to cough or sneeze, do it into a tissue. Then throw the tissue into the trash. If you don't have tissues, cough or sneeze into the bend of your elbow.  Don t share food or personal items with people in your household. This includes items like eating and drinking utensils, towels, and bedding.  Wear a cloth face mask around other people. During a public health emergency, medical face masks may be reserved for healthcare workers.  You may need to make a cloth face mask of your own. You can do this using a bandana, T-shirt, or other cloth. The [facility] has instructions on how to make a face mask. Wear the mask so that it covers both your nose and mouth.  If you need to go to a hospital or clinic, call ahead to let them know. Expect the care team to wear masks, gowns, gloves, and eye protection. You may need to come to a different entrance or wait in a separate room.  Follow all instructions from your care team.    If you ve been diagnosed with COVID-19    Stay home and away from others, including other people in your home. (This is called self-isolation.) Don t leave home unless you need to get medical care. Don t go to work, school, or public places. Don t use buses, taxis, or other public transportation.    Follow all instructions from your care team.    If you need to go to a hospital or clinic, call ahead to let them know. Expect the care team to wear masks, gowns, gloves, and eye protection. You may need to come to a different entrance or wait in a separate room.    Wear a face mask over your nose and mouth. This is to protect others from your germs. If you can t wear a mask, your caregivers should wear one. You may need to make your own mask using a bandana, T-shirt, or other cloth. See the CDC s instructions on how to make a face mask.    Have no contact with pets and other animals.    Don t share food or personal items with people in your household. This includes items like eating and drinking utensils, towels, and bedding.    If you need to cough or sneeze, do it into a tissue. Then throw the tissue into the trash. If you don't have tissues, cough or sneeze into the bend of your elbow.    Wash your hands often.    Self-care at home   At this time, there is no medicine approved to prevent or treat COVID-19. The FDA has approved an antiviral medicine (called remdesivir) for people being treated in the hospital. This is for people 12 years  and older who weigh more than about 88 pounds (40 kgs). In certain cases, it may also be used for people younger than 12 years or who weigh less than about 88 pounds (40 kgs)..  Currently, treatment is mostly aimed at helping your body while it fights the virus.    Getting extra rest.    Drink extra fluids 6 to 8 glasses of liquids each day), unless a doctor has told you not to. Ask your care team which fluids are best for you. Avoid fluids that contain caffeine or alcohol.    Taking over-the-counter (OTC) medicine to reduce pain and fever. Your care team will tell you which medicine to use.  If you ve been in the hospital for COVID-19, follow your care team s instructions. They will tell you when to stop self-isolation. They may also have you change positions to help your breathing (such as lying on your belly).  If a test showed that you have COVID-19, you may be asked to donate plasma after you ve recovered. (This is called COVID-19 convalescent plasma donation.) The plasma may contain antibodies to help fight the virus in others. Experts don't know if the plasma will work well as a treatment. Research continues, and the FDA has approved it for emergency use in certain people with serious or life-threatening COVID-19. For more information, talk to your care team.  Caring for a sick person     Follow all instructions from the care team.    Wash your hands often.    Wear protective clothing as advised.    Make sure the sick person wears a mask. If they can't wear a mask, don t stay in the same room with them. If you must be in the same room, wear a face mask. Make sure the mask covers both the nose and mouth.    Keep track of the sick person s symptoms.    Clean surfaces often with disinfectant. This includes phones, kitchen counters, fridge door handles, bathroom surfaces, and others.    Don t let anyone share household items with the sick person. This includes eating and drinking tools, towels, sheets, or  blankets.    Clean fabrics and laundry well.    Keep other people and pets away from the sick person.    When you can stop self-isolation  When you are sick with COVID-19, you should stay away from other people. This is called self-isolation. The rules for ending self-isolation depend on your health in general.  If you are normally healthy:  You can stop self-isolation when all 3 of these are true:    You ve had no fever--and no medicine that reduces fever--for at least 24 hours. And     Your symptoms are better, such as cough or trouble breathing. And     At least 10 days have passed since your symptoms first started.  Talk with your care team before you leave home. They may tell you it s okay to leave, or they may give you different advice. You do not need to be re-tested.  If you have a weak immune system, or you ve had severe COVID-19:  Follow your care team s instructions. You may be asked to self-isolate for 10 days to 20 days after your symptoms first started. Your care team may want to re-test you for COVID-19.  Note: If you re being treated for cancer, have an immune disorder (such as HIV), or have had a transplant (organ or bone marrow), you may have a weak immune system.  If you've already had COVID-19 once:  If you had the virus over 3 months ago, and you ve been exposed again, treat it like you've never had COVID-19. Stay home, limit your contact with others, call your care team, and watch for symptoms.  If it s been less than 3 months since you had the virus, you re symptom-free, and you ve been exposed again: You don t need to self-isolate or be re-tested. But if you develop new symptoms that can t be linked to another illness, please self-isolate and contact your care team.  When you return to public settings  When you are well enough to go outside your home, follow the CDC s guidance on cloth face masks.    Anyone over age 2 should wear a face mask in public, especially when it's hard to socially  distance. This includes public transit, public protests and marches, and crowded stores, bars, and restaurants.    Face masks are most likely to reduce the spread of COVID-19 when they are widely used by people who are out in the public.  Certain people should not wear a face covering. These include:    Children under 2 years old    Anyone with a health, developmental, or mental health condition that can be made worse by wearing a mask    Anyone who is unconscious or unable to remove the face covering without help. See the CDC's guidance on who should not wear a face mask.  When to call your care team  Call your care team right away if a sick person has any of these:    Trouble breathing    Pain or pressure in chest  If a sick person has any of these, call 911:    Trouble breathing that gets worse    Pain or pressure in chest that gets worse    Blue tint to lips or face    Fast or irregular heartbeat    Confusion or trouble waking    Fainting or loss of consciousness    Coughing up blood  Going home from the hospital   If you have COVID-19 and were recently in the hospital:    Follow the instructions above for self-care and isolation.    Follow the hospital care team s instructions.    Ask questions if anything is unclear to you. Write down answers so you remember them.  Date last modified: 11/23/2020  [employer] last reviewed this educational content on 4/1/2020  This information has been modified by your health care provider with permission from the publisher.    8498-7933 [employer]. [address] [place] [address], [place] [address]. All rights reserved. This information is not intended as a substitute for professional medical care. Always follow your healthcare professional's instructions.

## 2021-09-22 LAB — SARS-COV-2 RNA RESP QL NAA+PROBE: NEGATIVE

## 2021-10-09 ENCOUNTER — HEALTH MAINTENANCE LETTER (OUTPATIENT)
Age: 51
End: 2021-10-09

## 2022-09-11 ENCOUNTER — HEALTH MAINTENANCE LETTER (OUTPATIENT)
Age: 52
End: 2022-09-11

## 2023-01-22 ENCOUNTER — HEALTH MAINTENANCE LETTER (OUTPATIENT)
Age: 53
End: 2023-01-22

## 2024-02-24 ENCOUNTER — HEALTH MAINTENANCE LETTER (OUTPATIENT)
Age: 54
End: 2024-02-24